# Patient Record
Sex: FEMALE | Race: WHITE | NOT HISPANIC OR LATINO | Employment: OTHER | URBAN - METROPOLITAN AREA
[De-identification: names, ages, dates, MRNs, and addresses within clinical notes are randomized per-mention and may not be internally consistent; named-entity substitution may affect disease eponyms.]

---

## 2021-01-21 ENCOUNTER — OFFICE VISIT (OUTPATIENT)
Dept: GASTROENTEROLOGY | Facility: CLINIC | Age: 81
End: 2021-01-21

## 2021-01-21 VITALS
TEMPERATURE: 97.8 F | HEIGHT: 62 IN | DIASTOLIC BLOOD PRESSURE: 82 MMHG | WEIGHT: 124.8 LBS | HEART RATE: 69 BPM | SYSTOLIC BLOOD PRESSURE: 192 MMHG | BODY MASS INDEX: 22.97 KG/M2

## 2021-01-21 DIAGNOSIS — R19.7 DIARRHEA, UNSPECIFIED TYPE: Primary | ICD-10-CM

## 2021-01-21 PROCEDURE — 99203 OFFICE O/P NEW LOW 30 MIN: CPT | Performed by: NURSE PRACTITIONER

## 2021-01-21 RX ORDER — MONTELUKAST SODIUM 4 MG/1
1 TABLET, CHEWABLE ORAL DAILY
COMMUNITY
Start: 2020-11-16

## 2021-01-21 RX ORDER — INFLUENZA A VIRUS A/MICHIGAN/45/2015 X-275 (H1N1) ANTIGEN (FORMALDEHYDE INACTIVATED), INFLUENZA A VIRUS A/SINGAPORE/INFIMH-16-0019/2016 IVR-186 (H3N2) ANTIGEN (FORMALDEHYDE INACTIVATED), INFLUENZA B VIRUS B/PHUKET/3073/2013 ANTIGEN (FORMALDEHYDE INACTIVATED), AND INFLUENZA B VIRUS B/MARYLAND/15/2016 BX-69A ANTIGEN (FORMALDEHYDE INACTIVATED) 60; 60; 60; 60 UG/.7ML; UG/.7ML; UG/.7ML; UG/.7ML
1 INJECTION, SUSPENSION INTRAMUSCULAR ONCE
COMMUNITY
Start: 2020-11-03

## 2021-01-21 RX ORDER — LEVOTHYROXINE SODIUM 88 UG/1
1 TABLET ORAL DAILY
COMMUNITY
Start: 2020-11-02

## 2021-01-21 RX ORDER — POTASSIUM CHLORIDE 750 MG/1
1 TABLET, EXTENDED RELEASE ORAL DAILY
COMMUNITY
Start: 2020-11-02

## 2021-01-21 RX ORDER — CHLORTHALIDONE 25 MG/1
25 TABLET ORAL DAILY
COMMUNITY
Start: 2020-11-12

## 2021-01-21 RX ORDER — ALBUTEROL SULFATE 90 UG/1
1 AEROSOL, METERED RESPIRATORY (INHALATION) DAILY
COMMUNITY
Start: 2020-11-03

## 2021-01-21 RX ORDER — INSULIN GLARGINE 100 [IU]/ML
36 INJECTION, SOLUTION SUBCUTANEOUS NIGHTLY
COMMUNITY
Start: 2020-12-29

## 2021-01-21 RX ORDER — SIMVASTATIN 40 MG
1 TABLET ORAL DAILY
COMMUNITY
Start: 2020-10-27

## 2021-01-21 RX ORDER — ISOSORBIDE MONONITRATE 60 MG/1
60 TABLET, EXTENDED RELEASE ORAL DAILY
COMMUNITY
Start: 2020-11-17

## 2021-01-21 RX ORDER — OMEPRAZOLE 20 MG/1
1 CAPSULE, DELAYED RELEASE ORAL DAILY
COMMUNITY
Start: 2020-10-27

## 2021-01-21 NOTE — PROGRESS NOTES
GASTROENTEROLOGY OFFICE NOTE  Michelle Gandara  [id number]  1940    CARE TEAM  Patient Care Team:  Imani Hurst APRN as PCP - General (Internal Medicine)    Referring Provider: Imani Hurst APRN    Chief Complaint   Patient presents with   • Diarrhea        HISTORY OF PRESENT ILLNESS:  Ms. Bird is an 80-year-old female who presents today with a history of ischemic colitis status post mesenteric bypass.  She began having diarrhea with associated abdominal pain back in early December.  She saw her primary care doctor who had concern for possible small bowel obstruction.  It was recommended she follow a liquid diet and advance to soft foods after 2 to 3 days.  As well as discontinuing all over-the-counter meds such as Lomotil.  She followed these instructions and within 3 days her abdominal pain had resolved and diarrhea had lessened greatly.  She has improved even more so since that time.  She is followed up with her vascular surgeon who apparently ran some test on her and indicated that she did not have any blockages.  He recommended a high-fiber diet.  She has been following high-fiber diet and her diarrhea has resolved completely.  She has a soft formed bowel movement daily, nonbloody.    PAST MEDICAL HISTORY  Past Medical History:   Diagnosis Date   • Diabetes mellitus (CMS/[facility])    • GERD (gastroesophageal reflux disease)    • Hyperlipidemia    • Hyperthyroidism         PAST SURGICAL HISTORY  Past Surgical History:   Procedure Laterality Date   • CARDIAC SURGERY      double bypass   • CATARACT EXTRACTION     • CHOLECYSTECTOMY     • COLON SURGERY      removed part of it        MEDICATIONS:    Current Outpatient Medications:   •  albuterol sulfate  (90 Base) MCG/ACT inhaler, Inhale 1 puff Daily., Disp: , Rfl:   •  Breo Ellipta 100-25 MCG/INH inhaler, Inhale 1 puff As Needed., Disp: , Rfl:   •  chlorthalidone (HYGROTON) 25 MG tablet, Take 25 mg by mouth Daily., Disp: , Rfl:   •  colestipol  (COLESTID) 1 g tablet, Take 1 tablet by mouth Daily., Disp: , Rfl:   •  Fluzone High-Dose Quadrivalent 0.7 ML suspension prefilled syringe, Inject 1 syringe as directed 1 (One) Time., Disp: , Rfl:   •  isosorbide mononitrate (IMDUR) 60 MG 24 hr tablet, Take 60 mg by mouth Daily., Disp: , Rfl:   •  Lantus 100 UNIT/ML injection, Inject 36 Units under the skin into the appropriate area as directed Every Night., Disp: , Rfl:   •  levothyroxine (SYNTHROID, LEVOTHROID) 88 MCG tablet, Take 1 tablet by mouth Daily., Disp: , Rfl:   •  omeprazole (priLOSEC) 20 MG capsule, Take 1 capsule by mouth Daily., Disp: , Rfl:   •  potassium chloride (K-DUR,KLOR-CON) 10 MEQ CR tablet, Take 1 tablet by mouth Daily., Disp: , Rfl:   •  simvastatin (ZOCOR) 40 MG tablet, Take 1 tablet by mouth Daily., Disp: , Rfl:     ALLERGIES  Allergies   Allergen Reactions   • Penicillins Other (See Comments)     n        FAMILY HISTORY:  Family History   Problem Relation Age of Onset   • Colon cancer Neg Hx    • Colon polyps Neg Hx        SOCIAL HISTORY  Social History     Socioeconomic History   • Marital status:      Spouse name: Not on file   • Number of children: Not on file   • Years of education: Not on file   • Highest education level: Not on file   Tobacco Use   • Smoking status: Heavy Tobacco Smoker     Packs/day: 0.50   • Smokeless tobacco: Never Used   Substance and Sexual Activity   • Alcohol use: Never     Frequency: Never   • Drug use: Never   • Sexual activity: Never       REVIEW OF SYSTEMS  Review of Systems   Constitutional: Negative for activity change, appetite change, chills, diaphoresis, fatigue, fever, unexpected weight gain and unexpected weight loss.   HENT: Negative for congestion, dental problem, drooling, ear discharge, ear pain, facial swelling, hearing loss, mouth sores, nosebleeds, postnasal drip, rhinorrhea, sinus pressure, sneezing, sore throat, swollen glands, tinnitus, trouble swallowing and voice change.   "  Respiratory: Negative for apnea, cough, choking, chest tightness, shortness of breath, wheezing and stridor.    Cardiovascular: Negative for chest pain, palpitations and leg swelling.   Gastrointestinal: Negative for abdominal distention, abdominal pain, anal bleeding, blood in stool, constipation, diarrhea, nausea, rectal pain, vomiting, GERD and indigestion.   Endocrine: Negative for cold intolerance, heat intolerance, polydipsia, polyphagia and polyuria.   Musculoskeletal: Negative for arthralgias, back pain, gait problem, joint swelling, myalgias, neck pain, neck stiffness and bursitis.   Skin: Negative for color change, dry skin, rash and skin lesions.   Allergic/Immunologic: Negative for environmental allergies, food allergies and immunocompromised state.   Neurological: Negative for dizziness, tremors, seizures, syncope, facial asymmetry, speech difficulty, weakness, light-headedness, numbness, headache, memory problem and confusion.   Hematological: Negative for adenopathy. Does not bruise/bleed easily.   Psychiatric/Behavioral: Negative for agitation, behavioral problems, decreased concentration, dysphoric mood, hallucinations, self-injury, sleep disturbance, suicidal ideas, negative for hyperactivity, depressed mood and stress. The patient is not nervous/anxious.          PHYSICAL EXAM   BP (!) 192/82 (BP Location: Right arm, Patient Position: Sitting, Cuff Size: Adult)   Pulse 69   Temp 97.8 °F (36.6 °C) (Temporal)   Ht 157.5 cm (62\")   Wt 56.6 kg (124 lb 12.8 oz)   BMI 22.83 kg/m²   Physical Exam  Vitals signs and nursing note reviewed.   Constitutional:       Appearance: Normal appearance. She is well-developed.   HENT:      Head: Normocephalic and atraumatic.      Nose: Nose normal.      Mouth/Throat:      Mouth: Mucous membranes are moist.      Pharynx: Oropharynx is clear.   Eyes:      Pupils: Pupils are equal, round, and reactive to light.   Neck:      Musculoskeletal: Normal range of motion " and neck supple.   Cardiovascular:      Rate and Rhythm: Normal rate and regular rhythm.   Pulmonary:      Effort: Pulmonary effort is normal.      Breath sounds: Normal breath sounds. No wheezing or rales.   Abdominal:      General: Bowel sounds are normal.      Palpations: Abdomen is soft. There is no mass.      Tenderness: There is no abdominal tenderness. There is no guarding or rebound.      Hernia: No hernia is present.   Musculoskeletal: Normal range of motion.   Skin:     General: Skin is warm and dry.   Neurological:      Mental Status: She is alert and oriented to person, place, and time.      Cranial Nerves: No cranial nerve deficit.   Psychiatric:         Behavior: Behavior normal.         Judgment: Judgment normal.       Results Review:  [facility] records reviewed and discussed with patient.     ASSESSMENT / PLAN  1.  Diarrhea-now resolved  -Continue high-fiber diet  -No indication for further evaluation or treatment at this time    Return if symptoms worsen or fail to improve.    I discussed the patients findings and my recommendations with patient    WALKER Hendricks

## 2023-09-06 ENCOUNTER — TELEPHONE (OUTPATIENT)
Dept: CARDIOLOGY | Facility: CLINIC | Age: 83
End: 2023-09-06
Payer: MEDICARE

## 2023-09-06 NOTE — TELEPHONE ENCOUNTER
Karlene, respiratory therapist, with [facility] called wanting to discuss possible treatment for Michelle in addition to her oxygen that was ordered while admitted in the hospital. They recently received notes from patients hospital admission and she had some secretions. She does not see a pulmonologist and they were trying to find who she is currently active with other than her PCP. She is currently scheduled with oSheila in November for 1 yr SHANNAN fu. Call back number is [phone] ext [phone]

## 2023-09-06 NOTE — TELEPHONE ENCOUNTER
Karlene from [facility] called wanting orders sent over treatment plan after recent hospitalization.  Related that we had not seen the pt since 11/10/2022 and scheduled for follow up 11/16/2023.  They will call her PCP or hospitalist for clarification.

## 2023-11-16 ENCOUNTER — OFFICE VISIT (OUTPATIENT)
Dept: CARDIOLOGY | Facility: CLINIC | Age: 83
End: 2023-11-16
Payer: MEDICARE

## 2023-11-16 VITALS
BODY MASS INDEX: 19.69 KG/M2 | OXYGEN SATURATION: 93 % | SYSTOLIC BLOOD PRESSURE: 138 MMHG | HEART RATE: 81 BPM | DIASTOLIC BLOOD PRESSURE: 70 MMHG | HEIGHT: 62 IN | WEIGHT: 107 LBS

## 2023-11-16 DIAGNOSIS — C79.51 METASTATIC CANCER TO BONE: ICD-10-CM

## 2023-11-16 DIAGNOSIS — G47.33 OBSTRUCTIVE SLEEP APNEA SYNDROME: ICD-10-CM

## 2023-11-16 PROBLEM — G47.30 SLEEP APNEA: Status: ACTIVE | Noted: 2023-11-16

## 2023-11-16 RX ORDER — ONDANSETRON 4 MG/1
TABLET, ORALLY DISINTEGRATING ORAL
COMMUNITY
Start: 2023-09-05

## 2023-11-16 RX ORDER — POTASSIUM CHLORIDE 750 MG/1
30 CAPSULE, EXTENDED RELEASE ORAL DAILY
COMMUNITY

## 2023-11-16 RX ORDER — MEGESTROL ACETATE 40 MG/ML
SUSPENSION ORAL
COMMUNITY
Start: 2023-11-07

## 2023-11-16 RX ORDER — HYDROCODONE BITARTRATE AND ACETAMINOPHEN 10; 325 MG/1; MG/1
TABLET ORAL SEE ADMIN INSTRUCTIONS
COMMUNITY
Start: 2023-10-30

## 2023-11-16 NOTE — PROGRESS NOTES
[facility] Provider Note     Date:   2023   Name: Michelle Gandara  :   1940  PCP: Imani Hurst APRN    Chief Complaint   Patient presents with    Follow-up    Sleep Apnea       Subjective     History of Present Illness  Michelle Gandara is a 83 y.o. female who presents today for  PAP compliance report dated 10/15/2023 to 2023 download interpreted in clinic today.  Shown patient uses her machine 100% of the time, over 4 hours 100% of the time, for an average use of 7 hours and 37 minutes.  Her overall AHI is 1.5 showing excellent compliance and excellent control.    Advance care planning pamphlet given and discussed with patient.  She reports her mask, machine, airflow are comfortable and working well.  She was recently diagnosed with lung cancer that metastasized to her bones.  They have stopped chemo since it is not working and are treating with palliative radiation.  She has 2-3 radiation treatments left.  She is worried that when she passes her  will not do well since he has mild dementia.  Their adult children live close 1 daughter lives with them.  She said her cancer doctor has ordered hospice for her.    She also said that her and her  have a strong Spiritism support system.    No specialty comments available.    Allergies   Allergen Reactions    Nsaids Anaphylaxis, Hives, Nausea Only and Other (See Comments)    Penicillins Other (See Comments)     n     Naproxen Other (See Comments)       Current Outpatient Medications:     albuterol sulfate  (90 Base) MCG/ACT inhaler, Inhale 1 puff Daily., Disp: , Rfl:     Aspirin 81 MG capsule, Take 1 capsule every day by oral route., Disp: , Rfl:     Breo Ellipta 100-25 MCG/INH inhaler, Inhale 1 puff As Needed., Disp: , Rfl:     chlorthalidone (HYGROTON) 25 MG tablet, Take 1 tablet by mouth Daily., Disp: , Rfl:     Cholecalciferol 50 MCG (2000 UT) tablet, Take 1 tablet by mouth Daily., Disp: , Rfl:      colestipol (COLESTID) 1 g tablet, Take 1 tablet by mouth Daily., Disp: , Rfl:     Fluzone High-Dose Quadrivalent 0.7 ML suspension prefilled syringe, Inject 1 syringe as directed 1 (One) Time., Disp: , Rfl:     HYDROcodone-acetaminophen (NORCO)  MG per tablet, Take  by mouth See Admin Instructions. Take 1 tablet by mouth every 6-8 hours as needed, Disp: , Rfl:     isosorbide mononitrate (IMDUR) 60 MG 24 hr tablet, Take 1 tablet by mouth Daily., Disp: , Rfl:     Lantus 100 UNIT/ML injection, Inject 36 Units under the skin into the appropriate area as directed Every Night., Disp: , Rfl:     levothyroxine (SYNTHROID, LEVOTHROID) 88 MCG tablet, Take 1 tablet by mouth Daily., Disp: , Rfl:     megestrol (MEGACE) 40 MG/ML suspension, SHAKE LIQUID AND TAKE 20 ML BY MOUTH EVERY DAY WITH MEALS, Disp: , Rfl:     omeprazole (priLOSEC) 20 MG capsule, Take 1 capsule by mouth Daily., Disp: , Rfl:     ondansetron ODT (ZOFRAN-ODT) 4 MG disintegrating tablet, , Disp: , Rfl:     potassium chloride (K-DUR,KLOR-CON) 10 MEQ CR tablet, Take 1 tablet by mouth Daily., Disp: , Rfl:     potassium chloride (MICRO-K) 10 MEQ CR capsule, Take 3 capsules by mouth Daily., Disp: , Rfl:     simvastatin (ZOCOR) 40 MG tablet, Take 1 tablet by mouth Daily., Disp: , Rfl:     Past Medical History:   Diagnosis Date    Diabetes mellitus     GERD (gastroesophageal reflux disease)     Hyperlipidemia     Hyperthyroidism     Sleep apnea 11/16/2023      Past Surgical History:   Procedure Laterality Date    CARDIAC SURGERY      double bypass    CATARACT EXTRACTION      CHOLECYSTECTOMY      COLON SURGERY      removed part of it     Family History   Problem Relation Age of Onset    Colon cancer Neg Hx     Colon polyps Neg Hx      Social History     Socioeconomic History    Marital status:    Tobacco Use    Smoking status: Every Day     Packs/day: .5     Types: Cigarettes     Passive exposure: Past    Smokeless tobacco: Never   Vaping Use    Vaping Use:  "Never used   Substance and Sexual Activity    Alcohol use: Never    Drug use: Never    Sexual activity: Never       Objective     Vital Signs:  /70 (BP Location: Left arm)   Pulse 81   Ht 157.5 cm (62\")   Wt 48.5 kg (107 lb)   SpO2 93%   BMI 19.57 kg/m²   Estimated body mass index is 19.57 kg/m² as calculated from the following:    Height as of this encounter: 157.5 cm (62\").    Weight as of this encounter: 48.5 kg (107 lb).         Physical Exam  Vitals reviewed.   Cardiovascular:      Rate and Rhythm: Normal rate and regular rhythm.      Heart sounds: Normal heart sounds.   Pulmonary:      Effort: Pulmonary effort is normal.   Skin:     General: Skin is warm and dry.   Neurological:      Mental Status: She is alert and oriented to person, place, and time.                     Assessment and Plan     Diagnoses and all orders for this visit:    1. Obstructive sleep apnea syndrome  Comments:  Benefiting from PAP therapy.  Plan to continue.  Orders:  -     PAP Therapy    2. Metastatic cancer to bone  Comments:  Per patient she is getting ready to start hospice.        Recommendations: ER if symptoms increase and Report if any new/changing symptoms immediately      Follow Up  Return in about 1 year (around 11/16/2024) for SHANNAN.    Swati Villagomez, APRN   11/16/2023     Please note that this explicitly excludes time spent on other separate billable services such as performing procedures or test interpretation, when applicable.    This note was created using dictation software which occasionally transcribes nonsensical phrases. Please contact the provider if any clarification is needed.   "